# Patient Record
Sex: FEMALE | Race: WHITE | NOT HISPANIC OR LATINO | Employment: OTHER | RURAL
[De-identification: names, ages, dates, MRNs, and addresses within clinical notes are randomized per-mention and may not be internally consistent; named-entity substitution may affect disease eponyms.]

---

## 2018-01-11 ENCOUNTER — CONVERSION ENCOUNTER (OUTPATIENT)
Dept: FAMILY MEDICINE CLINIC | Facility: CLINIC | Age: 63
End: 2018-01-11

## 2019-06-12 VITALS — DIASTOLIC BLOOD PRESSURE: 81 MMHG | SYSTOLIC BLOOD PRESSURE: 134 MMHG

## 2019-12-30 ENCOUNTER — CLINICAL SUPPORT (OUTPATIENT)
Dept: FAMILY MEDICINE CLINIC | Facility: CLINIC | Age: 64
End: 2019-12-30

## 2019-12-30 VITALS
OXYGEN SATURATION: 98 % | RESPIRATION RATE: 16 BRPM | BODY MASS INDEX: 25.2 KG/M2 | TEMPERATURE: 97.7 F | WEIGHT: 147.6 LBS | HEART RATE: 115 BPM | HEIGHT: 64 IN | SYSTOLIC BLOOD PRESSURE: 128 MMHG | DIASTOLIC BLOOD PRESSURE: 82 MMHG

## 2019-12-30 DIAGNOSIS — Z02.4 ENCOUNTER FOR CDL (COMMERCIAL DRIVING LICENSE) EXAM: Primary | ICD-10-CM

## 2019-12-30 PROBLEM — E07.9 THYROID DISORDER: Status: ACTIVE | Noted: 2019-12-30

## 2019-12-30 PROBLEM — T36.95XA ANTIBIOTIC-INDUCED YEAST INFECTION: Status: RESOLVED | Noted: 2019-12-30 | Resolved: 2019-12-30

## 2019-12-30 PROBLEM — E03.2 HYPOTHYROIDISM DUE TO NON-MEDICATION EXOGENOUS SUBSTANCES: Status: ACTIVE | Noted: 2019-12-30

## 2019-12-30 PROBLEM — E78.5 HYPERLIPIDEMIA: Status: ACTIVE | Noted: 2019-12-30

## 2019-12-30 PROBLEM — E66.3 OVERWEIGHT: Status: ACTIVE | Noted: 2019-12-30

## 2019-12-30 PROBLEM — B37.9 ANTIBIOTIC-INDUCED YEAST INFECTION: Status: RESOLVED | Noted: 2019-12-30 | Resolved: 2019-12-30

## 2019-12-30 PROBLEM — J06.9 UPPER RESPIRATORY INFECTION WITH COUGH AND CONGESTION: Status: RESOLVED | Noted: 2019-12-30 | Resolved: 2019-12-30

## 2019-12-30 PROBLEM — H57.12 EYE PAIN, LEFT: Status: RESOLVED | Noted: 2019-12-30 | Resolved: 2019-12-30

## 2019-12-30 PROBLEM — C50.012 MALIGNANT NEOPLASM INVOLVING BOTH NIPPLE AND AREOLA OF LEFT BREAST IN FEMALE (HCC): Status: ACTIVE | Noted: 2019-12-30

## 2019-12-30 LAB
BILIRUB BLD-MCNC: NEGATIVE MG/DL
CLARITY, POC: CLEAR
COLOR UR: YELLOW
GLUCOSE UR STRIP-MCNC: NEGATIVE MG/DL
KETONES UR QL: NEGATIVE
LEUKOCYTE EST, POC: NEGATIVE
NITRITE UR-MCNC: NEGATIVE MG/ML
PH UR: 7 [PH] (ref 5–8)
PROT UR STRIP-MCNC: NEGATIVE MG/DL
RBC # UR STRIP: NEGATIVE /UL
SP GR UR: 1 (ref 1–1.03)
UROBILINOGEN UR QL: NORMAL

## 2019-12-30 PROCEDURE — 81003 URINALYSIS AUTO W/O SCOPE: CPT | Performed by: FAMILY MEDICINE

## 2019-12-30 PROCEDURE — DOTPHY: Performed by: FAMILY MEDICINE

## 2019-12-30 RX ORDER — LEVOTHYROXINE SODIUM 75 MCG
TABLET ORAL
COMMUNITY
Start: 2019-10-30

## 2019-12-30 RX ORDER — IBANDRONATE SODIUM 150 MG/1
TABLET, FILM COATED ORAL
COMMUNITY
Start: 2019-12-24 | End: 2021-12-23

## 2019-12-30 RX ORDER — ATORVASTATIN CALCIUM 20 MG/1
TABLET, FILM COATED ORAL
COMMUNITY
Start: 2019-12-09 | End: 2021-12-23

## 2019-12-30 RX ORDER — LOSARTAN POTASSIUM 50 MG/1
TABLET ORAL
COMMUNITY
Start: 2019-11-09

## 2019-12-30 NOTE — PROGRESS NOTES
Medical Examination    Subjective   Lupis Walters is a 64 y.o. female who presents today for a  fitness determination physical exam. The patient reports no problems.  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 She reports no significant elevation in her blood pressure.  It has stabilized on medication.  She is tolerating the medication.  There is no history of [place].  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      Past Medical History:   Diagnosis Date   • Cyst of parotid gland    • Elevated blood pressure reading    • Hypothyroidism due to non-medication exogenous substances    • Malignant neoplasm involving both nipple and areola of left breast in female (CMS/HCC)    • Mixed hyperlipidemia    • Overweight        History reviewed. No pertinent surgical history.    Family History   Problem Relation Age of Onset   • Hyperlipidemia Mother    • Coronary artery disease Father         and/ or Hypertesion   • Hyperlipidemia Sister         and/or Hypertension   • Coronary artery disease Sister    • Hyperlipidemia Brother    • Coronary artery disease Brother         and/ or Hypertension   • Stroke Brother    • Stroke Paternal Grandmother    • Breast cancer Paternal Aunt    • Breast cancer Paternal Aunt    • Leukemia Maternal Uncle        Current Outpatient Medications on File Prior to Visit   Medication Sig Dispense Refill   • atorvastatin (LIPITOR) 20 MG tablet      • ibandronate (BONIVA) 150 MG tablet      • losartan (COZAAR) 50 MG tablet      • SYNTHROID 75 MCG tablet        No current facility-administered medications on file prior to visit.        PHQ-2 Depression Screening  Little interest or pleasure in doing things?     Feeling down, depressed, or hopeless?     PHQ-2 Total Score         Review of Systems  Review of Systems   Constitutional: Negative for activity  "change, chills, fatigue and fever.   HENT: Positive for hearing loss (chronic, stable - uses hearing aids). Negative for congestion, dental problem, ear pain and sore throat.    Eyes: Negative for pain, discharge and visual disturbance.   Respiratory: Negative for cough and shortness of breath.    Cardiovascular: Negative for chest pain, palpitations and leg swelling.   Gastrointestinal: Negative for abdominal pain, constipation, diarrhea, nausea and vomiting.   Endocrine: Negative for heat intolerance.   Genitourinary: Negative for dysuria, flank pain and hematuria.   Musculoskeletal: Negative for arthralgias, back pain, gait problem, joint swelling, myalgias, neck pain and neck stiffness.   Skin: Negative for rash and wound.   Neurological: Negative for tremors, seizures, syncope, speech difficulty, weakness, light-headedness, numbness and headaches.   Hematological: Does not bruise/bleed easily.   Psychiatric/Behavioral: Negative for behavioral problems.          Objective      Vitals:    12/30/19 0925   BP: 128/82   Pulse: 115   Resp: 16   Temp: 97.7 °F (36.5 °C)   SpO2: 98%   Weight: 67 kg (147 lb 9.6 oz)   Height: 162.6 cm (64\")       Physical Exam   Constitutional: She is oriented to person, place, and time. She appears well-developed and well-nourished. No distress.   HENT:   Head: Normocephalic and atraumatic.   Right Ear: External ear normal.   Left Ear: External ear normal.   Nose: Nose normal.   Mouth/Throat: Oropharynx is clear and moist.   Eyes: Pupils are equal, round, and reactive to light. Conjunctivae and EOM are normal. Right eye exhibits no discharge. Left eye exhibits no discharge. No scleral icterus.   Neck: Normal range of motion. Neck supple. No JVD present.   Cardiovascular: Normal rate, regular rhythm, normal heart sounds and intact distal pulses.   No murmur heard.  Pulmonary/Chest: Effort normal and breath sounds normal. She has no wheezes.   Abdominal: Soft. Bowel sounds are normal. " There is no tenderness. There is no rebound and no guarding.   Musculoskeletal: Normal range of motion. She exhibits no edema, tenderness or deformity.   Lymphadenopathy:     She has no cervical adenopathy.   Neurological: She is alert and oriented to person, place, and time. She displays normal reflexes. No cranial nerve deficit. She exhibits normal muscle tone. Coordination normal.   Skin: Skin is warm. Capillary refill takes less than 2 seconds. No rash noted. No erythema.   Psychiatric: She has a normal mood and affect. Her behavior is normal. Judgment and thought content normal.       Vision:   Visual Acuity Screening    Right eye Left eye Both eyes   Without correction:      With correction: 20/20 20/20 20/20       Applicant can recognize and distinguish among traffic control signals and devices showing standard red, green, and martir colors.  Applicant has peripheral vision to 90 degrees in each eye.    Applicant meets visual acuity requirement only when wearing corrective lenses.    Monocular Vision?: No    Hearing:  Applicant can distinguish forced whisper at a distance of 5 feet with both ears    Hearing aid was used for tests.      Labs:  Brief Urine Lab Results  (Last result in the past 365 days)      Color   Clarity   Blood   Leuk Est   Nitrite   Protein   CREAT   Urine HCG        12/30/19 0934 Yellow Clear Negative Negative Negative Negative                 Assessment/Plan      Diagnoses and all orders for this visit:    1. 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   Meets standards in 49 .41;  qualifies for 2 year certificate.   Forms completed.     Medical examiners certificate completed and printed.  Return as needed.

## 2021-12-23 ENCOUNTER — CLINICAL SUPPORT (OUTPATIENT)
Dept: FAMILY MEDICINE CLINIC | Facility: CLINIC | Age: 66
End: 2021-12-23

## 2021-12-23 VITALS
OXYGEN SATURATION: 99 % | TEMPERATURE: 97.6 F | HEART RATE: 68 BPM | WEIGHT: 145.38 LBS | SYSTOLIC BLOOD PRESSURE: 130 MMHG | RESPIRATION RATE: 18 BRPM | BODY MASS INDEX: 24.82 KG/M2 | DIASTOLIC BLOOD PRESSURE: 86 MMHG | HEIGHT: 64 IN

## 2021-12-23 DIAGNOSIS — Z02.4 ENCOUNTER FOR CDL (COMMERCIAL DRIVING LICENSE) EXAM: Primary | ICD-10-CM

## 2021-12-23 PROBLEM — H43.819 POSTERIOR VITREOUS DETACHMENT: Status: ACTIVE | Noted: 2021-12-10

## 2021-12-23 LAB
BILIRUB BLD-MCNC: NEGATIVE MG/DL
CLARITY, POC: CLEAR
COLOR UR: YELLOW
GLUCOSE UR STRIP-MCNC: NEGATIVE MG/DL
KETONES UR QL: NEGATIVE
LEUKOCYTE EST, POC: ABNORMAL
NITRITE UR-MCNC: NEGATIVE MG/ML
PH UR: 5 [PH] (ref 5–8)
PROT UR STRIP-MCNC: ABNORMAL MG/DL
RBC # UR STRIP: NEGATIVE /UL
SP GR UR: 1.02 (ref 1–1.03)
UROBILINOGEN UR QL: NORMAL

## 2021-12-23 PROCEDURE — 81002 URINALYSIS NONAUTO W/O SCOPE: CPT | Performed by: FAMILY MEDICINE

## 2021-12-23 PROCEDURE — DOTPHY: Performed by: FAMILY MEDICINE

## 2021-12-23 RX ORDER — ATORVASTATIN CALCIUM 40 MG/1
TABLET, FILM COATED ORAL
COMMUNITY
Start: 2021-11-21

## 2021-12-23 NOTE — PROGRESS NOTES
Medical Examination    Subjective   Lupis Walters is a 66 y.o. female who presents today for a  fitness determination physical exam. The patient reports no new problems.  She has a PMH of hypertension (controlled today).     Her last CDL in this office was 2019, and a 2 year medical certificate was given.  She had been put on BP medication just prior to that visit.  She reports no significant elevation in her blood pressure.  It has stabilized on medication.  She is tolerating the medication.  There is no history of [place].  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       Past Medical History:   Diagnosis Date   • Antibiotic-induced yeast infection    • Cyst of parotid gland    • Elevated blood pressure reading    • Eye pain, left    • Hypothyroidism due to non-medication exogenous substances    • Malignant neoplasm involving both nipple and areola of left breast in female (HCC)    • Mixed hyperlipidemia    • Overweight    • Upper respiratory infection with cough and congestion        History reviewed. No pertinent surgical history.    Family History   Problem Relation Age of Onset   • Hyperlipidemia Mother    • Coronary artery disease Father         and/ or Hypertesion   • Hyperlipidemia Sister         and/or Hypertension   • Coronary artery disease Sister    • Hyperlipidemia Brother    • Coronary artery disease Brother         and/ or Hypertension   • Stroke Brother    • Stroke Paternal Grandmother    • Breast cancer Paternal Aunt    • Breast cancer Paternal Aunt    • Leukemia Maternal Uncle        Medication Sig   • atorvastatin (LIPITOR) 40 MG tablet    • Cholecalciferol 50 MCG (2000 UT) capsule Take 2,000 Units by mouth Daily.   • losartan (COZAAR) 50 MG tablet    • SYNTHROID 75 MCG tablet          Review of Systems  Review of Systems   Constitutional: Negative for activity change, chills, fatigue and fever.   HENT:  "Positive for hearing loss (chronic, stable - uses hearing aids). Negative for congestion, dental problem, ear pain and sore throat.    Eyes: Negative for pain, discharge and visual disturbance.   Respiratory: Negative for cough and shortness of breath.    Cardiovascular: Negative for chest pain, palpitations and leg swelling.   Gastrointestinal: Negative for abdominal pain, constipation, diarrhea, nausea and vomiting.   Endocrine: Negative for heat intolerance.   Genitourinary: Negative for dysuria, flank pain and hematuria.   Musculoskeletal: Negative for arthralgias, back pain, gait problem, joint swelling, myalgias, neck pain and neck stiffness.   Skin: Negative for rash and wound.   Neurological: Negative for tremors, seizures, syncope, speech difficulty, weakness, light-headedness, numbness and headaches.   Hematological: Does not bruise/bleed easily.   Psychiatric/Behavioral: Negative for behavioral problems.          Objective      Vitals:    12/23/21 0814   BP: 130/86   BP Location: Right arm   Patient Position: Sitting   Cuff Size: Adult   Pulse: 68   Resp: 18   Temp: 97.6 °F (36.4 °C)   TempSrc: Temporal   SpO2: 99%   Weight: 65.9 kg (145 lb 6 oz)   Height: 162.6 cm (64\")       Physical Exam  Constitutional:       General: She is not in acute distress.     Appearance: Normal appearance. She is well-developed. She is not ill-appearing.   HENT:      Head: Normocephalic and atraumatic.      Right Ear: Tympanic membrane, ear canal and external ear normal.      Left Ear: Tympanic membrane, ear canal and external ear normal.      Ears:      Comments: Left TM dull     Nose: Nose normal.      Mouth/Throat:      Mouth: Mucous membranes are moist.      Pharynx: Oropharynx is clear. Posterior oropharyngeal erythema present.   Eyes:      General: No scleral icterus.        Right eye: No discharge.         Left eye: No discharge.      Extraocular Movements: Extraocular movements intact.      Conjunctiva/sclera: " Conjunctivae normal.      Pupils: Pupils are equal, round, and reactive to light.   Neck:      Vascular: No carotid bruit or JVD.   Cardiovascular:      Rate and Rhythm: Normal rate and regular rhythm.      Pulses:           Radial pulses are 2+ on the right side and 2+ on the left side.      Heart sounds: Normal heart sounds. No murmur heard.      Pulmonary:      Effort: Pulmonary effort is normal.      Breath sounds: Normal breath sounds. No wheezing, rhonchi or rales.   Abdominal:      General: Bowel sounds are normal.      Palpations: Abdomen is soft.      Tenderness: There is no abdominal tenderness. There is no guarding or rebound.   Genitourinary:     Comments: Exam not performed  Musculoskeletal:         General: No tenderness or deformity. Normal range of motion.      Cervical back: Normal range of motion and neck supple.      Right lower leg: No edema.      Left lower leg: No edema.   Lymphadenopathy:      Cervical: No cervical adenopathy.   Skin:     General: Skin is warm.      Capillary Refill: Capillary refill takes less than 2 seconds.      Findings: No erythema or rash.   Neurological:      General: No focal deficit present.      Mental Status: She is alert and oriented to person, place, and time.      Cranial Nerves: No cranial nerve deficit.      Motor: No abnormal muscle tone.      Coordination: Coordination normal.      Deep Tendon Reflexes: Reflexes normal.      Reflex Scores:       Bicep reflexes are 1+ on the right side and 1+ on the left side.       Patellar reflexes are 1+ on the right side and 1+ on the left side.  Psychiatric:         Behavior: Behavior normal.         Thought Content: Thought content normal.         Judgment: Judgment normal.         Vision:   Visual Acuity Screening    Right eye Left eye Both eyes   Without correction:      With correction: 20/25 20/40 20/20       Applicant can recognize and distinguish among traffic control signals and devices showing standard red,  green, and martir colors.  Applicant has peripheral vision to 90 degrees in each eye.  Applicant meets visual acuity requirement only when wearing corrective lenses.  Monocular Vision?: No    Hearing:  Applicant can distinguish forced whisper at a distance of 5 feet with both ears  Hearing aid was used for tests.    Labs:  Brief Urine Lab Results  (Last result in the past 365 days)      Color   Clarity   Blood   Leuk Est   Nitrite   Protein   CREAT   Urine HCG        12/23/21 0827 Yellow   Clear   Negative   Small (1+)   Negative   Trace                   Assessment/Plan     Diagnoses and all orders for this visit:    1. 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  Meets standards in 49 .41;  qualifies for 2 year certificate.     Medical examiners certificate completed and printed.  Return as needed.    I wore protective equipment throughout this patient encounter to include mask. Hand hygiene was performed before donning protective equipment and after removal when leaving the room.

## 2023-12-11 ENCOUNTER — TELEPHONE (OUTPATIENT)
Dept: FAMILY MEDICINE CLINIC | Facility: CLINIC | Age: 68
End: 2023-12-11
Payer: MEDICARE

## 2023-12-11 NOTE — TELEPHONE ENCOUNTER
Caller: Lupis Walters    Relationship: Self    Best call back number: [phone]    What was the call regarding: PATIENT CALLED TO SCHEDULE CDL EXAM WITH CALLY PEREZ ADVISED PATIENT SHE IS CONSIDERED A NEW PATIENT TO THE OFFICE AND WOULD NEED TO BE RE-ESTABLISHED.    SHE WOULD LIKE A CALL REGARDING THIS.

## 2024-08-21 PROCEDURE — 88360 TUMOR IMMUNOHISTOCHEM/MANUAL: CPT | Performed by: OBSTETRICS & GYNECOLOGY

## 2024-08-21 PROCEDURE — 88305 TISSUE EXAM BY PATHOLOGIST: CPT | Performed by: OBSTETRICS & GYNECOLOGY

## 2024-08-22 ENCOUNTER — LAB REQUISITION (OUTPATIENT)
Dept: LAB | Facility: HOSPITAL | Age: 69
End: 2024-08-22
Payer: MEDICARE

## 2024-08-22 DIAGNOSIS — N63.10 UNSPECIFIED LUMP IN THE RIGHT BREAST, UNSPECIFIED QUADRANT: ICD-10-CM

## 2024-08-23 LAB
LAB AP CASE REPORT: NORMAL
LAB AP DIAGNOSIS COMMENT: NORMAL
LAB AP SPECIAL STAINS: NORMAL
PATH REPORT.FINAL DX SPEC: NORMAL
PATH REPORT.GROSS SPEC: NORMAL

## 2024-08-28 LAB
LAB AP CASE REPORT: NORMAL
LAB AP DIAGNOSIS COMMENT: NORMAL
LAB AP SPECIAL STAINS: NORMAL
PATH REPORT.ADDENDUM SPEC: NORMAL
PATH REPORT.FINAL DX SPEC: NORMAL
PATH REPORT.GROSS SPEC: NORMAL

## 2024-08-29 LAB — GLOBAL PROGNOSTIC (BREAST) RESULT: NORMAL

## 2024-09-04 LAB — GLOBAL PROGNOSTIC (BREAST) RESULT: NORMAL

## 2024-10-15 LAB — REF LAB TEST METHOD: NORMAL
